# Patient Record
Sex: MALE | Race: WHITE
[De-identification: names, ages, dates, MRNs, and addresses within clinical notes are randomized per-mention and may not be internally consistent; named-entity substitution may affect disease eponyms.]

---

## 2021-01-25 ENCOUNTER — HOSPITAL ENCOUNTER (EMERGENCY)
Dept: HOSPITAL 47 - EC | Age: 76
Discharge: HOME | End: 2021-01-25
Payer: MEDICARE

## 2021-01-25 VITALS
TEMPERATURE: 97.6 F | RESPIRATION RATE: 20 BRPM | HEART RATE: 105 BPM | DIASTOLIC BLOOD PRESSURE: 97 MMHG | SYSTOLIC BLOOD PRESSURE: 146 MMHG

## 2021-01-25 DIAGNOSIS — F03.90: ICD-10-CM
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
## 2021-01-25 DIAGNOSIS — Z87.891: ICD-10-CM

## 2021-01-25 DIAGNOSIS — Z91.018: ICD-10-CM

## 2021-01-25 DIAGNOSIS — F43.20: Primary | ICD-10-CM

## 2021-01-25 DIAGNOSIS — S60.812A: ICD-10-CM

## 2021-01-25 DIAGNOSIS — Z88.0: ICD-10-CM

## 2021-01-25 LAB
ALBUMIN SERPL-MCNC: 3.9 G/DL (ref 3.5–5)
ALP SERPL-CCNC: 53 U/L (ref 38–126)
ALT SERPL-CCNC: 11 U/L (ref 4–49)
ANION GAP SERPL CALC-SCNC: 9 MMOL/L
AST SERPL-CCNC: 25 U/L (ref 17–59)
BASOPHILS # BLD AUTO: 0.1 K/UL (ref 0–0.2)
BASOPHILS NFR BLD AUTO: 0 %
BUN SERPL-SCNC: 31 MG/DL (ref 9–20)
CALCIUM SPEC-MCNC: 9.1 MG/DL (ref 8.4–10.2)
CHLORIDE SERPL-SCNC: 105 MMOL/L (ref 98–107)
CO2 SERPL-SCNC: 24 MMOL/L (ref 22–30)
EOSINOPHIL # BLD AUTO: 0.2 K/UL (ref 0–0.7)
EOSINOPHIL NFR BLD AUTO: 1 %
ERYTHROCYTE [DISTWIDTH] IN BLOOD BY AUTOMATED COUNT: 5.15 M/UL (ref 4.3–5.9)
ERYTHROCYTE [DISTWIDTH] IN BLOOD: 15 % (ref 11.5–15.5)
GLUCOSE SERPL-MCNC: 302 MG/DL (ref 74–99)
HCT VFR BLD AUTO: 42.4 % (ref 39–53)
HGB BLD-MCNC: 13.6 GM/DL (ref 13–17.5)
LYMPHOCYTES # SPEC AUTO: 1.7 K/UL (ref 1–4.8)
LYMPHOCYTES NFR SPEC AUTO: 14 %
MAGNESIUM SPEC-SCNC: 1.7 MG/DL (ref 1.6–2.3)
MCH RBC QN AUTO: 26.3 PG (ref 25–35)
MCHC RBC AUTO-ENTMCNC: 32 G/DL (ref 31–37)
MCV RBC AUTO: 82.4 FL (ref 80–100)
MONOCYTES # BLD AUTO: 0.8 K/UL (ref 0–1)
MONOCYTES NFR BLD AUTO: 6 %
NEUTROPHILS # BLD AUTO: 9.7 K/UL (ref 1.3–7.7)
NEUTROPHILS NFR BLD AUTO: 77 %
PLATELET # BLD AUTO: 147 K/UL (ref 150–450)
POTASSIUM SERPL-SCNC: 5 MMOL/L (ref 3.5–5.1)
PROT SERPL-MCNC: 6.5 G/DL (ref 6.3–8.2)
SODIUM SERPL-SCNC: 138 MMOL/L (ref 137–145)
WBC # BLD AUTO: 12.6 K/UL (ref 3.8–10.6)

## 2021-01-25 PROCEDURE — 83735 ASSAY OF MAGNESIUM: CPT

## 2021-01-25 PROCEDURE — 82075 ASSAY OF BREATH ETHANOL: CPT

## 2021-01-25 PROCEDURE — 80053 COMPREHEN METABOLIC PANEL: CPT

## 2021-01-25 PROCEDURE — 85025 COMPLETE CBC W/AUTO DIFF WBC: CPT

## 2021-01-25 PROCEDURE — 99285 EMERGENCY DEPT VISIT HI MDM: CPT

## 2021-01-25 PROCEDURE — 82009 KETONE BODYS QUAL: CPT

## 2021-01-25 PROCEDURE — 80320 DRUG SCREEN QUANTALCOHOLS: CPT

## 2021-01-25 PROCEDURE — 73110 X-RAY EXAM OF WRIST: CPT

## 2021-01-25 NOTE — XR
EXAM:

  XR Left Wrist Complete, 3 or More Views

 

CLINICAL HISTORY:

  ITS.REASON XR Reason: wrist pain

 

TECHNIQUE:

  Frontal, lateral and oblique views of the left wrist.

 

COMPARISON:

  No relevant prior studies available.

 

FINDINGS:

  Bones/joints:  Age-indeterminate irregularity of the distal radius and 

ulna.

  Soft tissues:  Punctate calcific opacity in the distal forearm.

 

IMPRESSION:     

  Age-indeterminate irregularity of the distal radius and ulna.  

Correlate with focal tenderness and prior imaging if available.
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
